# Patient Record
Sex: MALE | Race: WHITE | NOT HISPANIC OR LATINO | URBAN - METROPOLITAN AREA
[De-identification: names, ages, dates, MRNs, and addresses within clinical notes are randomized per-mention and may not be internally consistent; named-entity substitution may affect disease eponyms.]

---

## 2021-11-06 ENCOUNTER — OFFICE VISIT - RIVER FALLS (OUTPATIENT)
Dept: FAMILY MEDICINE | Facility: CLINIC | Age: 21
End: 2021-11-06

## 2021-11-06 ASSESSMENT — MIFFLIN-ST. JEOR: SCORE: 1962.1

## 2022-02-11 VITALS
HEIGHT: 75 IN | TEMPERATURE: 98.2 F | DIASTOLIC BLOOD PRESSURE: 73 MMHG | SYSTOLIC BLOOD PRESSURE: 122 MMHG | HEART RATE: 60 BPM | BODY MASS INDEX: 24 KG/M2 | RESPIRATION RATE: 16 BRPM | WEIGHT: 193 LBS

## 2022-02-16 NOTE — NURSING NOTE
Comprehensive Intake Entered On:  11/6/2021 8:30 AM CDT    Performed On:  11/6/2021 8:24 AM CDT by Darryl Wen CMA               Summary   Chief Complaint :   New pt here for ringing in both ears x 3 days.   Weight Measured :   193 lb(Converted to: 193 lb 0 oz, 87.543 kg)    Height Measured :   74.75 in(Converted to: 6 ft 3 in, 189.86 cm)    Body Mass Index :   24.28 kg/m2   Body Surface Area :   2.15 m2   Systolic Blood Pressure :   122 mmHg   Diastolic Blood Pressure :   73 mmHg   Mean Arterial Pressure :   89 mmHg   Peripheral Pulse Rate :   60 bpm   BP Site :   Right arm   Pulse Site :   Radial artery   Temperature Tympanic :   98.2 DegF(Converted to: 36.8 DegC)    Respiratory Rate :   16 br/min   Darryl Wen CMA - 11/6/2021 8:24 AM CDT   Health Status   Allergies Verified? :   Yes   Medication History Verified? :   Yes   Medical History Verified? :   Yes   Pre-Visit Planning Status :   Not completed   Tobacco Use? :   Never smoker   Darryl Wen CMA - 11/6/2021 8:24 AM CDT   Meds / Allergies   (As Of: 11/6/2021 8:30:07 AM CDT)   Allergies (Active)   No Known Medication Allergies  Estimated Onset Date:   Unspecified ; Created By:   Darryl Wen CMA; Reaction Status:   Active ; Category:   Drug ; Substance:   No Known Medication Allergies ; Type:   Allergy ; Updated By:   Darryl Wen CMA; Reviewed Date:   11/6/2021 8:27 AM CDT        Medication List   (As Of: 11/6/2021 8:30:07 AM CDT)        Procedures / Surgeries        -    Procedure History   (As Of: 11/6/2021 8:30:07 AM CDT)     Anesthesia Minutes:   0 ; Procedure Name:   Myringotomy and insertion of short-term tympanic ventilation tube ; Procedure Minutes:   0 ; Last Reviewed Dt/Tm:   11/6/2021 8:28:10 AM CDT            Social History   Social History   (As Of: 11/6/2021 8:30:07 AM CDT)   Tobacco:        Never (less than 100 in lifetime)   (Last Updated: 11/6/2021 8:27:21 AM CDT by Darryl Wen CMA)          Electronic Cigarette/Vaping:         Electronic Cigarette Use: Never.   (Last Updated: 11/6/2021 8:27:26 AM CDT by Darryl Wen CMA)          Employment/School:        Student   Comments:  11/6/2021 8:27 AM - Darryl Wen CMA: [employer] at [employer] [facility] [employer]   (Last Updated: 11/6/2021 8:27:34 AM CDT by Darryl Wen CMA)

## 2022-02-16 NOTE — PROGRESS NOTES
Chief Complaint      New pt here for ringing in both ears x 3 days.      History of Present Illness      3 day hx of ringing in the ears, hum constant. worse at night and quiet areas. no fever, ear pain, hearing loss, room spinning.  sometimes feels a little off balance but not true vertigo.       Review of Systems      Review of systems is negative with the exception of those noted in HPI       Physical Exam         Vitals & Measurements        T: 98.2  F (Tympanic)  HR: 60 (Peripheral)  RR: 16  BP: 122/73         HT: 74.75 in  WT: 193 lb  BMI: 24.28             Vitals as above per nursing documentation             Constitutional : nad appears well            Ears: ears patent B, TMS intact, noninjected             Nose: nasal mucosa is non-ededmatous. no discharge             Throat: pharynx is nonerythematous, no tonsillar hypertrophy, no exudate             Neck: neck supple, no adenopathy, no thyromegaly, no rigidity             Lungs: lungs CTA', no Wheezes, rhonchi or rales             Heart: heart RRR, nl S1, S2 no murmur             skin:  No rashes                    Assessment/Plan      1. Tinnitus (H93.19)         PI given and discussed.  Push fluids, rest and ibuprofen or tylenol for comfort.  Pt instructed to return to clinic for persistent or worsening symptoms.  Consider ENT referral if persistent and bothersome.      Patient Information      Name:  MELIDA PERAZA      Address:       [address] [place] [id number]      Sex: Male      YOB: 2000      Phone: [phone]      Emergency Contact: CHAYO PERAZA      MRN: [id number]      FIN: [id number]      Location: [facility]      Date of Service: 11/06/2021      Primary Care Physician:       NONE ,       Attending Physician:       Alvino ESCOBEDO, Di MCWILLIAMS, [phone]      Problem List/Past Medical History      Ongoing        No qualifying data      Historical        No qualifying data      Procedure/Surgical  History        Myringotomy and insertion of short-term tympanic ventilation tube                Medications      No active medications      Allergies      No Known Medication Allergies      Social History       Smoking Status        Never smoker      Electronic Cigarette/Vaping       Electronic Cigarette Use: Never.      Employment/School       Student      Tobacco       Never (less than 100 in lifetime)      Immunizations          Scheduled Immunizations          Dose Date(s)          DTaP          2000, 01/18/2001, 03/15/2001, 12/13/2001, 09/20/2005          Hep B          2000, 2000, 09/13/2001          Hib          2000, 01/18/2001, 03/15/2001, 09/13/2001          meningococcal conjugate vaccine          03/18/2019          meningococcal group B vaccine          03/18/2019, 04/16/2019          MMR (measles/mumps/rubella)          12/13/2001, 09/20/2005          pneumococcal (PCV7)          06/14/2001, 09/13/2001, 12/13/2001, 09/26/2002          SARS-CoV-2 (COVID-19) Pfizer-162b2          04/14/2021, 05/05/2021          tetanus/diphth/pertuss (Tdap) adult/adol          07/16/2012          varicella          09/13/2001, 08/19/2008  Signature Line  Signed and Authored by Di De Oliveira PA-C on 11/07/2021 08:47 PM CST         Charted Date:      November 07, 2021 8:46 PM CST      Subject / Title:      tinnitus      Performed By:      Di De Oliveira PA-C on November 07, 2021 8:47 PM CST      Electronically Signed By:      Di De Oliveira PA-C on November 07, 2021 8:47 PM CST      Visit Information:      [id number], [facility], Outpatient, 11/6/2021 - 11/8/2021